# Patient Record
Sex: FEMALE | Race: BLACK OR AFRICAN AMERICAN | NOT HISPANIC OR LATINO | Employment: UNEMPLOYED | URBAN - METROPOLITAN AREA
[De-identification: names, ages, dates, MRNs, and addresses within clinical notes are randomized per-mention and may not be internally consistent; named-entity substitution may affect disease eponyms.]

---

## 2019-11-28 ENCOUNTER — HOSPITAL ENCOUNTER (EMERGENCY)
Facility: HOSPITAL | Age: 24
Discharge: HOME | End: 2019-11-28
Attending: EMERGENCY MEDICINE

## 2019-11-28 VITALS
DIASTOLIC BLOOD PRESSURE: 67 MMHG | WEIGHT: 289 LBS | RESPIRATION RATE: 16 BRPM | TEMPERATURE: 98 F | SYSTOLIC BLOOD PRESSURE: 127 MMHG | HEART RATE: 98 BPM | OXYGEN SATURATION: 99 %

## 2019-11-28 DIAGNOSIS — R11.2 NAUSEA AND VOMITING, INTRACTABILITY OF VOMITING NOT SPECIFIED, UNSPECIFIED VOMITING TYPE: Primary | ICD-10-CM

## 2019-11-28 DIAGNOSIS — R19.7 DIARRHEA, UNSPECIFIED TYPE: ICD-10-CM

## 2019-11-28 LAB
B-HCG UR QL: NEGATIVE
CTP QC/QA: YES

## 2019-11-28 PROCEDURE — 99284 EMERGENCY DEPT VISIT MOD MDM: CPT

## 2019-11-28 PROCEDURE — 81025 URINE PREGNANCY TEST: CPT | Performed by: EMERGENCY MEDICINE

## 2019-11-28 PROCEDURE — 25000003 PHARM REV CODE 250: Performed by: EMERGENCY MEDICINE

## 2019-11-28 RX ORDER — ONDANSETRON 4 MG/1
4 TABLET, ORALLY DISINTEGRATING ORAL
Status: COMPLETED | OUTPATIENT
Start: 2019-11-28 | End: 2019-11-28

## 2019-11-28 RX ORDER — LOPERAMIDE HYDROCHLORIDE 2 MG/1
4 CAPSULE ORAL
Status: COMPLETED | OUTPATIENT
Start: 2019-11-28 | End: 2019-11-28

## 2019-11-28 RX ORDER — ONDANSETRON 4 MG/1
4 TABLET, ORALLY DISINTEGRATING ORAL EVERY 8 HOURS PRN
Qty: 12 TABLET | Refills: 0 | Status: SHIPPED | OUTPATIENT
Start: 2019-11-28

## 2019-11-28 RX ORDER — LOPERAMIDE HYDROCHLORIDE 2 MG/1
2 CAPSULE ORAL 3 TIMES DAILY PRN
Qty: 20 CAPSULE | Refills: 0 | Status: SHIPPED | OUTPATIENT
Start: 2019-11-28 | End: 2019-12-08

## 2019-11-28 RX ORDER — DICYCLOMINE HYDROCHLORIDE 20 MG/1
20 TABLET ORAL 2 TIMES DAILY PRN
Qty: 20 TABLET | Refills: 0 | Status: SHIPPED | OUTPATIENT
Start: 2019-11-28 | End: 2019-12-28

## 2019-11-28 RX ADMIN — LOPERAMIDE HYDROCHLORIDE 4 MG: 2 CAPSULE ORAL at 12:11

## 2019-11-28 RX ADMIN — ONDANSETRON 4 MG: 4 TABLET, ORALLY DISINTEGRATING ORAL at 12:11

## 2019-11-28 NOTE — ED PROVIDER NOTES
Encounter Date: 11/28/2019    SCRIBE #1 NOTE: IVeronica am scribing for, and in the presence of, Joshua Horton MD.       History     Chief Complaint   Patient presents with    Vomiting    Diarrhea     Time seen by provider: 11:58 AM on 11/28/2019    Mona Johnson is a 24 y.o. female who presents to the ED with an onset of nausea, vomiting and diarrhea for one day. She administered Pepto bismol at home with little relief. She also c/o of left side abdominal pain and chills. The patient denies fever, blood in her stool, or any other symptoms at this time. No gastrointestinal PMHx. Pertinent PSHx includes cholecystectomy. NKDA. No sick contacts. No recent abx, no recent gi procedures.      The history is provided by the patient.     Review of patient's allergies indicates:  No Known Allergies  History reviewed. No pertinent past medical history.  Past Surgical History:   Procedure Laterality Date    CHOLECYSTECTOMY       History reviewed. No pertinent family history.  Social History     Tobacco Use    Smoking status: Current Every Day Smoker     Packs/day: 0.50     Types: Cigarettes   Substance Use Topics    Alcohol use: Yes     Comment: occasional    Drug use: Not on file     Review of Systems   Constitutional: Positive for chills. Negative for fever.   HENT: Negative for sore throat.    Respiratory: Negative for shortness of breath.    Cardiovascular: Negative for chest pain.   Gastrointestinal: Positive for abdominal pain, diarrhea, nausea and vomiting. Negative for blood in stool.   Genitourinary: Negative for dysuria and flank pain.   Musculoskeletal: Negative for back pain.   Skin: Negative for rash.   Neurological: Negative for weakness.   Hematological: Does not bruise/bleed easily.       Physical Exam     Initial Vitals [11/28/19 1138]   BP Pulse Resp Temp SpO2   127/67 98 16 97.8 °F (36.6 °C) 99 %      MAP       --         Physical Exam    Nursing note and vitals reviewed.  Constitutional:  She appears well-developed and well-nourished. She is not diaphoretic.  Non-toxic appearance. She does not have a sickly appearance. She does not appear ill. No distress.   Well appearing   HENT:   Head: Normocephalic and atraumatic.   Mouth/Throat: Mucous membranes are normal.   Eyes: EOM are normal.   Neck: Normal range of motion. Neck supple.   Cardiovascular: Normal rate, regular rhythm and normal heart sounds. Exam reveals no gallop and no friction rub.    No murmur heard.  Pulmonary/Chest: Breath sounds normal. No respiratory distress. She has no wheezes. She has no rhonchi. She has no rales.   Abdominal: Soft. Normal appearance and bowel sounds are normal. She exhibits no distension. There is no tenderness. There is no rebound.   Musculoskeletal: Normal range of motion.   Neurological: She is alert and oriented to person, place, and time.   Skin: Skin is warm and dry.   Psychiatric: She has a normal mood and affect. Her behavior is normal. Judgment and thought content normal.         ED Course   Procedures  Labs Reviewed   POCT URINE PREGNANCY          Imaging Results    None          Medical Decision Making:   History:   Old Medical Records: I decided to obtain old medical records.  Clinical Tests:   Lab Tests: Ordered and Reviewed            Scribe Attestation:   Scribe #1: I performed the above scribed service and the documentation accurately describes the services I performed. I attest to the accuracy of the note.    I, Dr. Horton, personally performed the services described in this documentation. All medical record entries made by the scribe were at my direction and in my presence.  I have reviewed the chart and agree that the record reflects my personal performance and is accurate and complete.2:13 PM 11/28/2019        ED Course as of Nov 28 1404   Thu Nov 28, 2019   1144 SpO2: 99 % [EF]   1144 Resp: 16 [EF]   1144 Pulse: 98 [EF]   1144 Temp src: Oral [EF]   1144 Temp: 97.8 °F (36.6 °C) [EF]   1144 BP:  127/67 [EF]   1313 Preg Test, Ur: Negative [EF]      ED Course User Index  [EF] Joshua Horton MD         Clinical Impression:       ICD-10-CM ICD-9-CM   1. Nausea and vomiting, intractability of vomiting not specified, unspecified vomiting type R11.2 787.01   2. Diarrhea, unspecified type R19.7 787.91         Disposition:   Disposition: Discharged  Condition: Stable      24-year-old female presents to the ER with 1 day of nausea vomiting and diarrhea.  Some abdominal cramping.  No lower abdominal pain or vaginal bleeding no flank pain no dysuria.  No fevers no chills. No mucus no bloody diarrhea.  Symptoms much improved in the ER after Zofran, Imodium.  No risk factors for Clostridium difficile.  Patient will be discharged home with Zofran Bentyl Imodium.  Return to the ER if symptoms worsen or change.  Likely viral.  Abdominal pain not consistent any surgical emergency.  No right lower quadrant pain.  Pregnancy test negative.               Joshua Horton MD  11/28/19 [id number]

## 2019-11-28 NOTE — ED NOTES
C/o NVD that started today with left sided abd pain. Denies fever or other c/o. Alert calm even non labored respirations. Family at bedside

## 2019-11-28 NOTE — ED NOTES
States that zofran helped some continues to feel nauseated but has not thrown up. Resting in bed family at bedside